# Patient Record
Sex: FEMALE | Race: WHITE | Employment: STUDENT | URBAN - METROPOLITAN AREA
[De-identification: names, ages, dates, MRNs, and addresses within clinical notes are randomized per-mention and may not be internally consistent; named-entity substitution may affect disease eponyms.]

---

## 2022-03-18 PROBLEM — R07.89 OTHER CHEST PAIN: Status: ACTIVE | Noted: 2018-04-13

## 2022-03-19 PROBLEM — R00.2 PALPITATIONS: Status: ACTIVE | Noted: 2020-11-18

## 2023-10-09 ENCOUNTER — TELEPHONE (OUTPATIENT)
Age: 28
End: 2023-10-09

## 2023-10-09 NOTE — TELEPHONE ENCOUNTER
Spoke to pt. States she had a viral illness last week and has notice since then that her heart has been racing and she is very SOB, similar to when she had COVID and saw Dr. Peter Tubbs last. Explained we cannot give advice over the phone as it has been > 2 yrs since she was last seen. Recommend ER or UC if severe sx and call for f/u appt after. Verb understanding.

## 2024-11-20 ENCOUNTER — OFFICE VISIT (OUTPATIENT)
Dept: ORTHOPEDIC SURGERY | Age: 29
End: 2024-11-20
Payer: COMMERCIAL

## 2024-11-20 DIAGNOSIS — M79.672 LEFT FOOT PAIN: ICD-10-CM

## 2024-11-20 DIAGNOSIS — M76.822 TIBIALIS TENDINITIS OF LEFT LOWER EXTREMITY: ICD-10-CM

## 2024-11-20 DIAGNOSIS — M25.572 LEFT ANKLE PAIN, UNSPECIFIED CHRONICITY: Primary | ICD-10-CM

## 2024-11-20 PROCEDURE — 99204 OFFICE O/P NEW MOD 45 MIN: CPT | Performed by: ORTHOPAEDIC SURGERY

## 2024-11-20 RX ORDER — MELOXICAM 7.5 MG/1
7.5 TABLET ORAL DAILY
Qty: 30 TABLET | Refills: 3 | Status: SHIPPED | OUTPATIENT
Start: 2024-11-20

## 2024-11-20 NOTE — PROGRESS NOTES
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 female who presents with New Patient (Left foot/ankle xray obtained)  .  This patient presents the office today with a several week history of worsening left arch pain and deformity.  Her mom has a known history of a flatfoot deformity in the past.  She is tried a lace up ankle brace for this.  She states this really started when she started doing more strenuous exercise time.    History was obtained by Patient     ROS/Meds/PSH/PMH/FH/SH: I personally reviewed the patients standard intake form.  Below are the pertinents    Tobacco:  reports that she has never smoked. She has never used smokeless tobacco.  Diabetes: None      Physical Examination:    Exam of the left lower extremity shows swelling and tenderness palpation of the posterior tibial tendon at the insertion.  She has a progressive planovalgus foot.  She can perform a double leg toe rise but no single-leg toe rise on the side.  She has supple subtalar joint range of motion.  She has palpable pulses and intact sensation.    Imaging:   Interpretation of imaging  Left foot and XR: AP, Lateral, Oblique views     ICD-10-CM    1. Left ankle pain, unspecified chronicity  M25.572 XR ANKLE LEFT (MIN 3 VIEWS)      2. Left foot pain  M79.672 XR FOOT LEFT (2 VIEWS)      3. 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

## 2025-01-03 ENCOUNTER — OFFICE VISIT (OUTPATIENT)
Dept: ORTHOPEDIC SURGERY | Age: 30
End: 2025-01-03
Payer: COMMERCIAL

## 2025-01-03 DIAGNOSIS — M76.822 TIBIALIS TENDINITIS OF LEFT LOWER EXTREMITY: Primary | ICD-10-CM

## 2025-01-03 PROCEDURE — 99214 OFFICE O/P EST MOD 30 MIN: CPT | Performed by: ORTHOPAEDIC SURGERY

## 2025-01-03 NOTE — PROGRESS NOTES
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  This patient presents back to the office a week and continued complaints of pain located in her left foot and ankle with a history of a posterior tibial tendon insufficiency.  Of note her mom had a posterior tibial tendon reconstruction done for similar problem.  She is tried a home exercise program as well as a lace up ankle brace with this has had ongoing pain affecting activities of daily being significantly.    History was obtained by Patient     ROS/Meds/PSH/PMH/FH/SH: I personally reviewed the patients standard intake form.  Below are the pertinents    Tobacco:  reports that she has never smoked. She has never used smokeless tobacco.  Diabetes: None      Physical Examination:    Exam of the left lower extremity shows a planovalgus hindfoot.  She has swelling and tenderness palpation over the posterior tibial tendon and over the accessory navicular.  There is mild sinus Tarsi pain.  She has good supple subtalar joint range of motion.  She can perform a double leg toe rise but not a single-leg toe rise on the side.  She has palpable pulses and intact sensation.    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